# Patient Record
Sex: MALE | URBAN - METROPOLITAN AREA
[De-identification: names, ages, dates, MRNs, and addresses within clinical notes are randomized per-mention and may not be internally consistent; named-entity substitution may affect disease eponyms.]

---

## 2020-01-01 ENCOUNTER — OFFICE VISIT (OUTPATIENT)
Dept: URBAN - METROPOLITAN AREA CLINIC 27 | Facility: CLINIC | Age: 75
End: 2020-01-01
Payer: MEDICARE

## 2020-01-01 DIAGNOSIS — H40.051 OCULAR HYPERTENSION, RIGHT EYE: ICD-10-CM

## 2020-01-01 DIAGNOSIS — H34.8110 CENTRAL RETINAL VEIN OCCLS, RIGHT EYE, WITH MACULAR EDEMA: Primary | ICD-10-CM

## 2020-01-01 PROCEDURE — 92134 CPTRZ OPH DX IMG PST SGM RTA: CPT | Performed by: OPHTHALMOLOGY

## 2020-01-01 PROCEDURE — 92012 INTRM OPH EXAM EST PATIENT: CPT | Performed by: OPHTHALMOLOGY

## 2020-01-01 PROCEDURE — 67028 INJECTION EYE DRUG: CPT | Performed by: OPHTHALMOLOGY

## 2020-01-01 RX ORDER — TIMOLOL MALEATE 5 MG/ML
0.5 % SOLUTION/ DROPS OPHTHALMIC
Qty: 5 | Refills: 5 | Status: INACTIVE
Start: 2020-01-01 | End: 2020-01-01

## 2020-01-01 ASSESSMENT — INTRAOCULAR PRESSURE
OD: 24
OS: 15
OD: 13
OS: 10

## 2020-10-05 NOTE — IMPRESSION/PLAN
Impression: . 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 IOP normal. FA with sweeps from 12/24/18 showed well-perfused CRVO without NVE. Ozurdex  today. 

6 weeks, OCT OD, reeval CRVO OD (oz)

## 2020-11-23 NOTE — IMPRESSION/PLAN
Impression: CRVO w CME OD Plan: Exam/OCT show CRVO with resolved CME OD s/p Ozurdex 10/5/20. FA sweeps 12/24/18 showed well-perfused CRVO without NVE. No tx today. 

6 weeks, OCT OD, reeval CRVO OD (oz)

## 2020-11-23 NOTE — IMPRESSION/PLAN
Impression: Ocular hypertension, right eye: H40.051. Plan: IOP slightly elevated today. Start Timolol OD BID.

## 2021-01-01 ENCOUNTER — OFFICE VISIT (OUTPATIENT)
Dept: URBAN - METROPOLITAN AREA CLINIC 27 | Facility: CLINIC | Age: 76
End: 2021-01-01
Payer: MEDICARE

## 2021-01-01 DIAGNOSIS — H25.13 AGE-RELATED NUCLEAR CATARACT, BILATERAL: ICD-10-CM

## 2021-01-01 DIAGNOSIS — H43.813 BILATERAL VITREOUS DEGENERATION OF EYES: ICD-10-CM

## 2021-01-01 PROCEDURE — 99213 OFFICE O/P EST LOW 20 MIN: CPT | Performed by: OPHTHALMOLOGY

## 2021-01-01 PROCEDURE — 92134 CPTRZ OPH DX IMG PST SGM RTA: CPT | Performed by: OPHTHALMOLOGY

## 2021-01-01 PROCEDURE — 99212 OFFICE O/P EST SF 10 MIN: CPT | Performed by: OPHTHALMOLOGY

## 2021-01-01 PROCEDURE — 67028 INJECTION EYE DRUG: CPT | Performed by: OPHTHALMOLOGY

## 2021-01-01 ASSESSMENT — INTRAOCULAR PRESSURE
OD: 22
OS: 18
OD: 10
OS: 10
OS: 11
OD: 16

## 2021-01-04 NOTE — IMPRESSION/PLAN
Impression: CRVO w CME OD Plan: Exam/OCT show CRVO with resolved CME OD s/p Ozurdex 10/5/20. FA sweeps 12/24/18 showed well-perfused CRVO without NVE. No tx today. 

3-4 weeks, OCT OD, reeval CRVO OD (oz)

## 2021-03-01 NOTE — IMPRESSION/PLAN
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 FA sweeps 12/24/18 showed well-perfused CRVO without NVE. Reviewed options of observation, anti-VEGF, Ozurdex, and laser; recommend Ozurdex OD today. 

6 weeks, OCT OD, reeval CRVO OD (oz)